# Patient Record
Sex: MALE | Race: WHITE | Employment: UNEMPLOYED | URBAN - METROPOLITAN AREA
[De-identification: names, ages, dates, MRNs, and addresses within clinical notes are randomized per-mention and may not be internally consistent; named-entity substitution may affect disease eponyms.]

---

## 2018-03-28 ENCOUNTER — HOSPITAL ENCOUNTER (EMERGENCY)
Age: 38
Discharge: HOME | End: 2018-03-28
Attending: EMERGENCY MEDICINE
Payer: COMMERCIAL

## 2018-03-28 VITALS
HEART RATE: 108 BPM | HEIGHT: 71 IN | OXYGEN SATURATION: 92 % | BODY MASS INDEX: 27.3 KG/M2 | WEIGHT: 195 LBS | RESPIRATION RATE: 16 BRPM | TEMPERATURE: 98.6 F | DIASTOLIC BLOOD PRESSURE: 94 MMHG | SYSTOLIC BLOOD PRESSURE: 148 MMHG

## 2018-03-28 DIAGNOSIS — T40.2X1A OPIOID OVERDOSE, ACCIDENTAL OR UNINTENTIONAL, INITIAL ENCOUNTER (HCC): Primary | ICD-10-CM

## 2018-03-28 PROCEDURE — 99282 EMERGENCY DEPT VISIT SF MDM: CPT

## 2018-03-28 RX ORDER — NALOXONE HYDROCHLORIDE 4 MG/.1ML
SPRAY NASAL
Qty: 2 EACH | Refills: 0 | Status: SHIPPED | OUTPATIENT
Start: 2018-03-28

## 2018-03-29 NOTE — ED NOTES
MD [place] at bedside to discharge patient. Pt able to verbalize understanding of risks again.  Pt would like to remain in the ER as a visitor with his significant other

## 2018-03-29 NOTE — ED NOTES
Dr. Francisco Trejo at bedside. Pt refusing blood work and medical treatment at this time. Pt made aware of the possible risks associated with leaving against medical advice. Pt is alert and oriented, is able to walk with a steady gait, and is able to follow all commands.  Pt would like to leave by taxi, but wants to stay until his wife leaves

## 2018-03-29 NOTE — ED PROVIDER NOTES
EMERGENCY DEPARTMENT HISTORY AND PHYSICAL EXAM      Date: 3/28/2018  Patient Name: Radah Owens    History of Presenting Illness     Chief Complaint   Patient presents with    Drug Overdose     pt reported using alcohol and heroin today. Was found unresponsive. Given a total of 4 mg of narcan by EMS. Arrives A&Ox4. History Provided By: Patient    HPI: Radha Owens, 44 y.o. male with PMHx significant for substance abuse, presents via EMS to the ED for an acute sudden onset of drug overdose and associated syncopal episode. EMS reports finding pt unresponsive and gave pt 4 mg of narcan. Pt reports he had gotten into trouble last week and drank alcohol [employer]) today. He states he was intoxicated when he used heroin. Pt states he has been sober from heroin for six months. He states his wife called EMS. Pt is unable to specify whether he has heroin at home. He denies having any narcan at home. He denies any SI but notes feeling anxious and ashamed. Pt states he is planning to go to rehab tomorrow. He states he was last in rehab in Oct '17. Pt denies taking any medicines daily. He denies any medication allergies. Pt denies other sxs including fevers, chills, sore throat, cough, SOB, CP, abdominal pain, N/V/D, dysuria, myalgias, HA, and rashes. Pt denies other modifying factors. Social hx: +Tobacco, +EtOH, +Heroin    PCP: None    There are no other complaints, changes, or physical findings at this time. Past History     Past Medical History:  No past medical history on file. Past Surgical History:  No past surgical history on file. Family History:  No family history on file. Social History:  Social History   Substance Use Topics    Smoking status: Not on file    Smokeless tobacco: Not on file    Alcohol use Not on file       Allergies:  No Known Allergies      Review of Systems   Review of Systems   Constitutional: Negative for chills and fever.    HENT: Negative for congestion and sore throat. Eyes: Negative for visual disturbance. Respiratory: Negative for cough and shortness of breath. Cardiovascular: Negative for chest pain and leg swelling. Gastrointestinal: Negative for abdominal pain, blood in stool, diarrhea and nausea. Endocrine: Negative for polyuria. Genitourinary: Negative for dysuria and testicular pain. Musculoskeletal: Negative for arthralgias, joint swelling and myalgias. Skin: Negative for rash. Allergic/Immunologic: Negative for immunocompromised state. Neurological: Positive for syncope. Negative for weakness and headaches. Hematological: Does not bruise/bleed easily. Psychiatric/Behavioral: Negative for confusion. The patient is nervous/anxious. Positive for drug overdose. Physical Exam   Physical Exam   Constitutional: He is oriented to person, place, and time. He appears well-developed and well-nourished. HENT:   Head: Normocephalic and atraumatic. Mouth/Throat: Oropharynx is clear and moist.   Eyes: Conjunctivae and EOM are normal. Pupils are equal, round, and reactive to light. Neck: Normal range of motion. Neck supple. No tracheal deviation present. Cardiovascular: Normal rate, regular rhythm, normal heart sounds and intact distal pulses. No murmur heard. Pulmonary/Chest: Effort normal and breath sounds normal. No respiratory distress. He has no wheezes. He has no rales. Abdominal: Soft. Bowel sounds are normal. There is no tenderness. There is no rebound and no guarding. Musculoskeletal: He exhibits no edema or deformity. Neurological: He is alert and oriented to person, place, and time. GCS eye subscore is 4. GCS verbal subscore is 5. GCS motor subscore is 6. EOMI intact, no facial droop or asymmetry, normal/equal sensation in face. Uvula elevates at midline, no tongue deviation. Normal strength with head rotation and shoulder shrug.   5/5 Strength in the bilateral upper and lower extremities, no pronotor drift, normal finger to nose. No truncal ataxia. Normal speech: no dysarthria or aphasia. Skin: Skin is warm and dry. Psychiatric: He has a normal mood and affect. His speech is normal.   Nursing note and vitals reviewed. Diagnostic Study Results     Labs -   No results found for this or any previous visit (from the past 12 hour(s)). Radiologic Studies -   No orders to display         Medical Decision Making   I am the first provider for this patient. I reviewed the vital signs, available nursing notes, past medical history, past surgical history, family history and social history. Vital Signs-Reviewed the patient's vital signs. Patient Vitals for the past 12 hrs:   Temp Pulse Resp BP SpO2   03/28/18 2111 98.6 °F (37 °C) (!) 108 16 (!) 148/94 92 %       Records Reviewed: Old Medical Records    Provider Notes (Medical Decision Making):   Sxs consistent with opiod overdose. Pt advised he should stay for observation but he refuses. Pt appears clinically sober. He walks with a steady gait. His speech is not slurred. He verbalizes understanding of risks and benefits of going home. Will kinder dc with a prescription for narcan and return precautions. ED Course:   Initial assessment performed. The patients presenting problems have been discussed, and they are in agreement with the care plan formulated and outlined with them. I have encouraged them to ask questions as they arise throughout their visit. Critical Care Time:   0    Disposition:  9:49 PM   I informed the pt that he needed further observation for adequate evaluation for his opioid overdose and that by refusing the above, he is at risk for sudden death, further deterioration. He is awake, alert, and he understands his condition and the risks involved in leaving.      He is clinically aware of his surroundings and able to ask appropriate questions,  and the nurse present confirms he is not clinically intoxicated and able to make medical decisions. He verbalized knowing the risks and understood it was recommended that he stay and could also return at any time. . Patient was provided with warnings regarding worsening of his condition and were provided instructions to follow up with rehab or drug treatment team tomorrow or return to the Emergency Room as soon as possible. This discussion was witnessed by nurse Francisco Sherman. PLAN:  1. Discharge Medication List as of 3/28/2018  9:49 PM      START taking these medications    Details   naloxone (NARCAN) 4 mg/actuation nasal spray Use 1 spray intranasally into 1 nostril. Use a new Narcan nasal spray for subsequent doses and administer into alternating nostrils. May repeat every 2 to 3 minutes as needed. , Print, Disp-2 Each, R-0           2. Follow-up Information     Follow up With Details Comments Contact Info    [facility] EMERGENCY DEPT  If symptoms return. [address] [place] [address]  [phone]        Return to ED if worse     Diagnosis     Clinical Impression:   1. Opioid overdose, accidental or unintentional, initial encounter        Attestations: This note is prepared by Ag Benitez, acting as Scribe for [employer], DO. The scribe's documentation has been prepared under my direction and personally reviewed by me in its entirety. I confirm that the note above accurately reflects all work, treatment, procedures, and medical decision making performed by me.   [employer], DO

## 2018-03-29 NOTE — DISCHARGE INSTRUCTIONS
You are being discharged against my medical advice. It would be best if you were observed in the ED for 3-4 hours. As I explained the half life of the Narcan is very short so the narcan will wear off before the drug that you used. You could experience a recurrence of symptoms and you could stop breathing and die. Learning About Naloxone for Opioid Overdose  What is naloxone? Opioids are strong pain medicines. Examples include hydrocodone, oxycodone, fentanyl, and morphine. Heroin is an example of an illegal opioid. Taking too much of an opioid can cause death. An overdose is an emergency. Naloxone is a medicine that reverses the effects of an overdose. If you take it soon enough after an overdose, it can save your life. Naloxone comes in a rescue kit you can carry with you. You may hear it called a Narcan kit for short. The rescue kit may contain:  · The medicine. · Syringes and needles. · A nasal adapter for the syringes. · A separate nasal spray device. Your doctor can give you a prescription for a rescue kit and show you how to use it. In some places you can buy Narcan kits without a prescription. When is naloxone used? Naloxone is used when a person shows signs of an opioid overdose. A person may have overdosed if he or she is:  · Sleepy or hard to wake up. · Confused. · Not breathing normally. Make sure your family and friends know about these signs of an overdose. If someone appears to have overdosed, call 911. A drug overdose is an emergency. How do you use it? If you overdose, you may not be able to give yourself the medicine. So it's very important to teach friends and family how and when to give it to you. Rescue kits come with instructions. There are two ways to give the medicine. Many rescue kits can be used for either method. The methods are:  · Injection with needle and syringe. ¨ Training may be needed in order to use this method correctly.   ¨ Some rescue kits come with automatic injectors that don't require special training. ¨ The medicine can be injected through clothing. · Nasal spray. ¨ Many rescue kits come with a nasal adapter. It attaches to the syringe and turns the medicine into a mist.  ¨ The mist is sprayed into the nose of a person who has overdosed. The person needs to be lying down when the mist is sprayed. Overdose symptoms may return a few minutes after the first dose from the rescue kit. If that happens, a second dose is needed. Rescue kits come with two doses for that reason. Keep your rescue kit with you always. You never know when you might need it. If you think you or someone else may have overdosed but you're not sure, use the kit anyway. Aside from going through withdrawal, which may be uncomfortable, there is no downside to using this medicine. Always go to the emergency room after using naloxone. Doctors will want to make sure the overdose has been reversed. Follow-up care is a key part of your treatment and safety. Be sure to make and go to all appointments, and call your doctor if you are having problems. It's also a good idea to know your test results and keep a list of the medicines you take. Where can you learn more? Go to http://johana-kathryn.info/. Enter X320 in the search box to learn more about \"Learning About Naloxone for Opioid Overdose. \"  Current as of: November 3, 2016  Content Version: 11.4  © 9000-9493 [employer]. Care instructions adapted under license by [employer] (which disclaims liability or warranty for this information). If you have questions about a medical condition or this instruction, always ask your healthcare professional. [place] [address] any warranty or liability for your use of this information.